# Patient Record
Sex: MALE | Race: WHITE
[De-identification: names, ages, dates, MRNs, and addresses within clinical notes are randomized per-mention and may not be internally consistent; named-entity substitution may affect disease eponyms.]

---

## 2018-07-30 ENCOUNTER — HOSPITAL ENCOUNTER (OUTPATIENT)
Dept: HOSPITAL 92 - ERS | Age: 33
Setting detail: OBSERVATION
LOS: 1 days | Discharge: HOME | End: 2018-07-31
Attending: INTERNAL MEDICINE | Admitting: INTERNAL MEDICINE
Payer: COMMERCIAL

## 2018-07-30 VITALS — BODY MASS INDEX: 25.8 KG/M2

## 2018-07-30 DIAGNOSIS — E11.9: ICD-10-CM

## 2018-07-30 DIAGNOSIS — R07.89: Primary | ICD-10-CM

## 2018-07-30 DIAGNOSIS — F17.220: ICD-10-CM

## 2018-07-30 DIAGNOSIS — R06.00: ICD-10-CM

## 2018-07-30 DIAGNOSIS — R00.2: ICD-10-CM

## 2018-07-30 LAB
ALBUMIN SERPL BCG-MCNC: 4.4 G/DL (ref 3.5–5)
ALP SERPL-CCNC: 76 U/L (ref 40–150)
ALT SERPL W P-5'-P-CCNC: 55 U/L (ref 8–55)
ANION GAP SERPL CALC-SCNC: 14 MMOL/L (ref 10–20)
AST SERPL-CCNC: 22 U/L (ref 5–34)
BASOPHILS # BLD AUTO: 0 THOU/UL (ref 0–0.2)
BASOPHILS NFR BLD AUTO: 0.1 % (ref 0–1)
BILIRUB SERPL-MCNC: 0.7 MG/DL (ref 0.2–1.2)
BUN SERPL-MCNC: 11 MG/DL (ref 8.9–20.6)
CALCIUM SERPL-MCNC: 9.7 MG/DL (ref 7.8–10.44)
CHLORIDE SERPL-SCNC: 106 MMOL/L (ref 98–107)
CK MB SERPL-MCNC: 1.4 NG/ML (ref 0–6.6)
CK SERPL-CCNC: 87 U/L (ref 30–200)
CO2 SERPL-SCNC: 23 MMOL/L (ref 22–29)
CREAT CL PREDICTED SERPL C-G-VRATE: 0 ML/MIN (ref 70–130)
EOSINOPHIL # BLD AUTO: 0.1 THOU/UL (ref 0–0.7)
EOSINOPHIL NFR BLD AUTO: 1.7 % (ref 0–10)
GLOBULIN SER CALC-MCNC: 2.9 G/DL (ref 2.4–3.5)
GLUCOSE SERPL-MCNC: 142 MG/DL (ref 70–105)
HGB BLD-MCNC: 15.2 G/DL (ref 14–18)
LIPASE SERPL-CCNC: 22 U/L (ref 8–78)
LYMPHOCYTES # BLD: 1.8 THOU/UL (ref 1.2–3.4)
LYMPHOCYTES NFR BLD AUTO: 28.4 % (ref 21–51)
MCH RBC QN AUTO: 29.5 PG (ref 27–31)
MCV RBC AUTO: 84.2 FL (ref 78–98)
MONOCYTES # BLD AUTO: 0.6 THOU/UL (ref 0.11–0.59)
MONOCYTES NFR BLD AUTO: 8.8 % (ref 0–10)
NEUTROPHILS # BLD AUTO: 3.8 THOU/UL (ref 1.4–6.5)
NEUTROPHILS NFR BLD AUTO: 61 % (ref 42–75)
PLATELET # BLD AUTO: 332 THOU/UL (ref 130–400)
POTASSIUM SERPL-SCNC: 4 MMOL/L (ref 3.5–5.1)
RBC # BLD AUTO: 5.15 MILL/UL (ref 4.7–6.1)
SODIUM SERPL-SCNC: 139 MMOL/L (ref 136–145)
TROPONIN I SERPL DL<=0.01 NG/ML-MCNC: (no result) NG/ML (ref ?–0.03)
TROPONIN I SERPL DL<=0.01 NG/ML-MCNC: (no result) NG/ML (ref ?–0.03)
TROPONIN I SERPL DL<=0.01 NG/ML-MCNC: 0.01 NG/ML (ref ?–0.03)
WBC # BLD AUTO: 6.2 THOU/UL (ref 4.8–10.8)

## 2018-07-30 PROCEDURE — 93017 CV STRESS TEST TRACING ONLY: CPT

## 2018-07-30 PROCEDURE — 93005 ELECTROCARDIOGRAM TRACING: CPT

## 2018-07-30 PROCEDURE — 85025 COMPLETE CBC W/AUTO DIFF WBC: CPT

## 2018-07-30 PROCEDURE — 83036 HEMOGLOBIN GLYCOSYLATED A1C: CPT

## 2018-07-30 PROCEDURE — 93306 TTE W/DOPPLER COMPLETE: CPT

## 2018-07-30 PROCEDURE — A9500 TC99M SESTAMIBI: HCPCS

## 2018-07-30 PROCEDURE — 85379 FIBRIN DEGRADATION QUANT: CPT

## 2018-07-30 PROCEDURE — 82553 CREATINE MB FRACTION: CPT

## 2018-07-30 PROCEDURE — 71045 X-RAY EXAM CHEST 1 VIEW: CPT

## 2018-07-30 PROCEDURE — 94760 N-INVAS EAR/PLS OXIMETRY 1: CPT

## 2018-07-30 PROCEDURE — 78452 HT MUSCLE IMAGE SPECT MULT: CPT

## 2018-07-30 PROCEDURE — 80053 COMPREHEN METABOLIC PANEL: CPT

## 2018-07-30 PROCEDURE — 83690 ASSAY OF LIPASE: CPT

## 2018-07-30 PROCEDURE — G0378 HOSPITAL OBSERVATION PER HR: HCPCS

## 2018-07-30 PROCEDURE — 84484 ASSAY OF TROPONIN QUANT: CPT

## 2018-07-30 PROCEDURE — 82550 ASSAY OF CK (CPK): CPT

## 2018-07-30 PROCEDURE — 36415 COLL VENOUS BLD VENIPUNCTURE: CPT

## 2018-07-30 NOTE — HP
DATE OF ADMISSION:  07/30/2018

 

PRIMARY CARE PHYSICIAN:  The patient follows [facility].

 

CHIEF COMPLAINT:  Palpitations with chest pressure.

 

HISTORY OF PRESENT ILLNESS:  The patient is a 32-year-old male with prediabetes presented to the [facility] room with palpitations and chest pressure that has been ongoing over the past few days.  Yester
day, his symptoms got worse.  He had persistent palpitations for up to 3 hours.  He was diaphoretic a
nd clammy along with bilateral hand numbness.  He denies any drug use.  His thyroid studies were norm
al recently per patient report.  He does not consume excessive caffeine.  He denies similar symptoms 
in the past.  No family history of heart disease.

 

In the emergency room, his EKG showed nonspecific ST-T wave changes in the lateral leads.  Initial vi
kassandra signs showed temperature 98.5, respirations 18, pulse rate of 101 with blood pressure 130/76.  Hi
s chest x-ray was negative for infiltrate or mediastinal widening.

 

PAST MEDICAL HISTORY:  Prediabetes.

 

PAST SURGICAL HISTORY:  Reviewed with the patient and none.

 

ALLERGIES:  No known drug allergies.

 

CURRENT HOME MEDICATIONS:  Reviewed with the patient and none.

 

SOCIAL HISTORY:  The patient chews tobacco.  Denies any alcohol or drug use.

 

FAMILY HISTORY:  Negative for heart disease.  Father with diabetes.

 

REVIEW OF SYSTEMS:  The following complete review of systems was negative, unless otherwise mentioned
 in the HPI or below.  Constitutional:  Weight loss or gain, ability to conduct usual activities.  Sk
in:  Rash, itching.  Eyes:  Double vision, pain.  ENT/Mouth:  Nose bleeding, neck stiffness, pain, te
nderness.  Cardiovascular:  Palpitations, dyspnea on exertion, orthopnea.  Respiratory:  Shortness of
 breath, wheezing, cough, hemoptysis, fever or night sweats.  Gastrointestinal:  Poor appetite, abdom
inal pain, heartburn, nausea, vomiting, constipation, or diarrhea.  Genitourinary:  Urgency, frequenc
y, dysuria, nocturia.  Musculoskeletal:  Pain, swelling.  Neurologic/Psychiatric:  Anxiety, depressio
n.  Allergy/Immunologic:  Skin rash, bleeding tendency.

 

PHYSICAL EXAMINATION:

VITAL SIGNS:  As discussed above.

GENERAL:  A 32-year-old male in no apparent distress, somewhat anxious appearing.

HEENT:  Atraumatic, normocephalic, Sclerae are anicteric.  Moist mucous membrane.  No oral lesion.

NECK:  Supple, no JVD, no carotid bruit.

LUNGS:  Clear to auscultation bilaterally.

HEART:  S1, S2 present.  Regular rate and rhythm.  No murmur, rubs or gallops appreciated.  ABDOMEN: 
 Soft, nontender.  Bowel sounds present.

EXTREMITIES:  No edema or calf tenderness.

NEUROLOGIC:  Grossly nonfocal.  Moves all four extremities.

PSYCHIATRY:  Alert, awake, oriented x3.

SKIN:  Warm and dry.

LYMPH NODES:  No palpable lymph nodes in the neck.

PERIPHERAL VASCULAR:  Radial pulses palpable bilaterally.

MUSCULOSKELETAL:  No joint swelling or tenderness.

 

LABORATORY FINDINGS:  Troponins were negative.  CBC showed WBC 6.2 with hemoglobin 15.2, hematocrit 4
3.3, platelet 332,000.

 

D-dimer was negative.

 

Chemistry showed sodium 139, potassium 4.0, chloride 106, bicarbonate 23, BUN 11, creatinine 0.8.  LF
Ts in normal range.  EKG and chest x-ray by my review as discussed above.

 

IMPRESSION AND PLAN:

1.  Palpitations lasting for 3 hours along with chest pressure and exertional shortness of breath.  H
is symptoms consistent with possible paroxysmal tachyarrhythmias.  Panic disorder appears to be less 
likely.

2.  Prediabetes.  The patient recently had A1c checked that was around 6 per patient report.

3.  Tobacco dependence.  The patient was advised to quit tobacco use.

 

Plan of care was discussed with the patient in detail.  He stated understanding.

## 2018-07-30 NOTE — RAD
PORTABLE CHEST:

 

History: Chest pain

 

FINDINGS: 

Heart size and mediastinum are within normal limits. The lungs are clear of infiltrates. No significa
nt bony findings.

 

IMPRESSION: 

No active intrathoracic disease. 

 

POS: [facility]

## 2018-07-31 VITALS — TEMPERATURE: 99.1 F | DIASTOLIC BLOOD PRESSURE: 65 MMHG | SYSTOLIC BLOOD PRESSURE: 122 MMHG

## 2018-07-31 NOTE — STRESS
Acquisition Time: 2018-07-31  09:47:03

Total Exercise Time: 00:08:00

Test Indications: CHEST PAIN/PALPITATIONS

Medications: 

 

 

 

 

 

 

 

 

 

Protocol:     DAISY

Max HR: 187 BPM  98% of  Pred: 190 BPM

Max BP: 170/078 mmHG

Max Work Load: 10.1 METS

 

RESTING ECG: NORMAL SINUS RHYTHM AT 75 BPM WITH NON-SPECIFIC ST SEGMENT AND

 T-WAVE CHANGES

SYMPTOMS: DYSPNEA ON EXERTION

NORMAL BP RESPONSE

ECTOPY: NONE

ECG STRESS: NO SIGNIFICANT CHANGES

INTERPRETATION: INDETERMINATE GXT DUE TO RESTING ST-T CHANGES/AWAIT NUCLEAR

 IMAGES FOR DEFINITIVE DIAGNOSIS

 

Confirmed by NIGHAT ORTIZ ([id number]),  CHIRAG SILVA ([id number]) on 7/31/2018 1:50:46 PM

 

Referred By: MD ENZO CRAMER           Confirmed By:NIGHAT ORTIZ

## 2018-07-31 NOTE — PDOC.EVN
Event Note





- Event Note


Event Note: 





Received call from PCP 


A1c last month of 10.9 - Was started on Insulin and Metformin, Also was 

evaluated by Dr Gallego


Also was evaluated by Dr Alcaraz - Echo was ordered, Event monitor offered - 

Pt declined.


Please note that patient never reported above information.

## 2018-07-31 NOTE — NM
RADIONUCLIDE STRESS REST MYOCARDIAL PERFUSION SCAN WITH CT ATTENUATION CORRECTION AND SPECT IMAGING 

LEFT VENTRICULAR WALL MOTION EVALUATION AND EJECTION FRACTION:

 

History: Chest pain. 

 

FINDINGS: 

Aayush protocol. Total test time 8 minutes 0.seconds. 

 

Homogeneous uptake of radiotracer throughout the left ventricular myocardium. No focal perfusion defe
ct or reversibility. 

 

QGS analysis of gated SPECT images show no focal wall motion abnormalities. TID = 1.0. 

LHR = 21%

 

Left ventricular ejection fraction is calculated at 66%. 

 

IMPRESSION: 

Normal myocardial perfusion scan. Normal LVEF. 

 

POS: [facility]

## 2018-07-31 NOTE — CON
DATE OF CONSULTATION:  07/30/2018

 

HISTORY OF PRESENT ILLNESS:   The patient is a 32-year-old gentleman who presents for evaluation of p
alpitations and chest discomfort.  The patient states for approximately 2 months he has had palpitati
ons.  He states he feels at times that his heart is racing.  He also reports that this is associated 
with substernal chest discomfort.  The patient saw Dr. Alcaraz recently and was scheduled to undergo
 a cardiac evaluation.  He states his symptoms improved.  However, recently he noted having increasin
g palpitations.  These seemed to respond with vagal maneuvers.  The patient denies having any present
 chest discomfort.

 

PAST MEDICAL HISTORY:  None.

 

PAST SURGICAL HISTORY:  None.

 

SOCIAL HISTORY:  Nonsmoker.  No use of alcohol.

 

FAMILY HISTORY:  No strong family history of heart disease.

 

ALLERGIES:  No known drug allergies.

 

MEDICATIONS:  None.  

 

REVIEW OF SYSTEMS:  A 10-point system unremarkable.

 

PHYSICAL EXAMINATION:  

GENERAL:  Well-developed gentleman in no acute distress.

VITAL SIGNS:  Blood pressure was 117/66, heart rate was 73.  

NECK:  No jugular venous distention.  No carotid bruits.

LUNGS:  Clear to auscultation.

HEART:  Regular rate and rhythm, normal S1, S2.

ABDOMEN:  Nondistended.

EXTREMITIES:  Showed no edema.

SKIN:  Warm and dry.

NEUROLOGIC:  Nonfocal.

VASCULAR:  Radial pulses are 2+.

 

LABORATORY RESULTS:  His sodium 139, potassium 4.0, chloride 106, bicarbonate 23, BUN 11, creatinine 
0.88, glucose 142.  White blood count 6.2, hemoglobin 15.2, hematocrit 43.3, platelets 332.  

 

EKG revealed normal sinus rhythm with an ST-T wave abnormality suggestive of lateral ischemia.

 

IMPRESSION:

1.  Palpitations.

2.  Abnormal left chest pain.

3.  Abnormal ECG.

 

This gentleman presents with chest discomfort and palpitations.  He has an abnormal electrocardiogram
.  I recommend he proceed with a stress test to evaluate for evidence of ischemia.  An echocardiogram
 will also be obtained to evaluate his left ventricular function.  The patient will be monitored on t
elemetry and EPS consultation is also pending.  We will follow this patient with you through his hosp
italization.

## 2018-07-31 NOTE — DIS
DATE OF ADMISSION:  07/30/2018

 

DATE OF DISCHARGE:  07/31/2018

 

DISCHARGE DISPOSITION:  Home.

 

FOLLOWUP:  Follow up with primary care physician at [facility] ([facility].

 

INPATIENT CONSULTANTS:  Cardiology, Dr. Toñito Alcaraz and Electrophysiology, Dr. Rose.

 

BRIEF HOSPITAL COURSE:  The patient is a 32-year-old male with diabetes mellitus type 2, presented to
 the hospital with palpitations and chest pressure.  Please refer to the history and physical for fur
ther details.

 

The patient was admitted to the hospital with a diagnosis of chest discomfort along with palpitations
.  The patient was seen by Cardiology and Electrophysiology.  His D-dimer was negative.  He underwent
 Cardiolite stress test that was negative for reversible ischemia.  His TID was 1.0.  Echocardiogram 
was done that showed normal left ventricular ejection fraction of 55%-60% with mild mitral regurgitat
ion and mild tricuspid regurgitation.  Event monitor will be arranged by Dr. Alcaraz's office.  He h
as been cleared by Cardiology for discharge.

 

SIGNIFICANT LABORATORY DATA:

1.  Hemoglobin A1c 7.9.

2.  Troponins were negative.

3.  D-dimer was less than 0.27.

4.  Thyroid studies last month was in normal range.

 

The patient was seen and examined on the day of discharge.  He denies any new complaints.

 

Telemetry monitoring showed sinus rhythm without significant arrhythmias.

 

FINAL DIAGNOSES:

1.  Chest discomfort with palpitations, suspected supraventricular tachycardia.  Event monitor has be
en arranged.

2.  Diabetes mellitus type 2.  His A1c is 7.9.  Last month was 10.9.

3.  Tobacco dependence.  The patient was advised to quit tobacco use.

4.  Daily caffeine use.  This may be contributing to his symptoms.  He was advised to discontinue caf
feine.

 

Plan of care was discussed with the patient in detail and he stated understanding.

## 2018-07-31 NOTE — CON
DATE OF CONSULTATION:  07/30/2018

 

REFERRING PHYSICIAN:  Dr. Samuel.

 

HISTORY OF PRESENT ILLNESS:  I am seeing Mr. Ayon at our [place] [facility] as an electrophysiology con
[facility].  His problems are:

1.  Recurrent palpitations associated with dyspnea and atypical chest pains.

A.  Inferolateral ST depressions seen.

2.  No prior history of heart disease or heart attacks.

3.  History of borderline blood sugars in the past.

4.  Tobacco chewing.

 

ALLERGIES:  None noted.

 

MEDICATIONS:  Currently none.

 

SUBJECTIVE:  Mr. Ayon is here with complaints of palpitations and dyspnea, which occurs intermitte
ntly when he is lying down or moderate exertion also brings _____ in his car, these episodes comes an
d goes, occasional massaging his temple and his neck seems to bring some relief.  He does get dyspnei
c and dizzy with these episodes.  He has been completely passed out, some precordial chest discomfort
s also he notes.  He has no PND or orthopnea at this time.  No dizziness or loss of consciousness.  H
e does feel though these palpitations were worse when he lays down.  He is very anxious.

 

REVIEW OF SYSTEMS:  Rest of the 12-point systems otherwise, unremarkable.

 

PAST MEDICAL HISTORY:  No prior history of heart disease or heart attacks.  No history of arrhythmias
 in the past.  No childhood cardiac illnesses.

 

SOCIAL HISTORY:  He is a Ph.D. student.  He is originally from [place].

 

FAMILY HISTORY:  Negative for heart disease or early heart attacks or sudden cardiac death.

 

OBJECTIVE:

VITAL SIGNS:  Blood pressure initially 130/76, heart rate 88, respiration rate 16.  Patient is afebri
le.

GENERAL:  Alert and oriented man in no apparent distress.

NECK:  Supple.  Jugular veins not distended.

CHEST:  Coarse without crackles.

CARDIOVASCULAR:  Heart sounds are regular to rate and rhythm.  No murmur or gallop.

ABDOMEN:  Benign.  Bowel sounds positive.

EXTREMITIES:  Lower extremities without edema, clubbing or cyanosis.  Pulses are adequate.

NEUROLOGIC:  Nonfocal.

MUSCULOSKELETAL:  Without joint swelling or deformities.

SKIN:  Without rash.

 

DATABASE:  The EKG is reviewed revealing sinus rhythm, rate of 84 beats per minute with an inferior S
T-T depression is seen.  The IA is 154 milliseconds, QRS is 90 milliseconds, minimal slowing in the i
nitial portion of the QRS is seen, but not specific for preexcitation.  The QTC is 446.

 

LABORATORY DATA:  Chest x-ray reviewed reveals sinus rhythm, no significant changes.  Initial troponi
n less than 0.01, CK-MB 1.4, lipase 22.  Electrolytes are normal.  Glucose of 142.  The _____ globuli
n is 4.4, white cell count is 6.2, hemoglobin 15.2, platelet count is 332.

 

ASSESSMENT AND PLAN:  Mr. Ayon is a pleasant 32-year-old male registered in the [facility] who is here with palpitation, dyspnea, atypical chest pains.  His EKG performed non ST-T changes, 
so far nonrevealing.

 

Plan would be at this point:

1.  Admit for telemetry monitoring, repeat cardiac enzymes, although I doubt ischemia.

2.  A 2D echo possible ischemic workup could be reasonable as per Dr. Madden.  If stress test plann
ed, I would recommend an exercise stress test.

3.  Depending on the findings, he might require further electrophysiology workup.

 

We will follow with you.